# Patient Record
Sex: FEMALE | Race: BLACK OR AFRICAN AMERICAN | NOT HISPANIC OR LATINO | Employment: FULL TIME | URBAN - METROPOLITAN AREA
[De-identification: names, ages, dates, MRNs, and addresses within clinical notes are randomized per-mention and may not be internally consistent; named-entity substitution may affect disease eponyms.]

---

## 2021-04-27 ENCOUNTER — HISTORICAL (OUTPATIENT)
Dept: ADMINISTRATIVE | Facility: HOSPITAL | Age: 31
End: 2021-04-27

## 2021-04-27 LAB
ABS NEUT (OLG): 4.08 X10(3)/MCL (ref 2.1–9.2)
ALBUMIN SERPL-MCNC: 4 GM/DL (ref 3.5–5)
ALBUMIN/GLOB SERPL: 1.2 RATIO (ref 1.1–2)
ALP SERPL-CCNC: 58 UNIT/L (ref 40–150)
ALT SERPL-CCNC: 10 UNIT/L (ref 0–55)
ANISOCYTOSIS BLD QL SMEAR: 1
APPEARANCE, UA: CLEAR
AST SERPL-CCNC: 20 UNIT/L (ref 5–34)
B-HCG SERPL QL: NEGATIVE
BACTERIA SPEC CULT: ABNORMAL /HPF
BILIRUB SERPL-MCNC: 0.2 MG/DL
BILIRUB UR QL STRIP: NEGATIVE
BILIRUBIN DIRECT+TOT PNL SERPL-MCNC: 0.1 MG/DL (ref 0–0.5)
BILIRUBIN DIRECT+TOT PNL SERPL-MCNC: 0.1 MG/DL (ref 0–0.8)
BUN SERPL-MCNC: 8.8 MG/DL (ref 7–18.7)
BURR CELLS BLD QL SMEAR: 1 (ref 0–0)
CALCIUM SERPL-MCNC: 8.8 MG/DL (ref 8.4–10.2)
CHLORIDE SERPL-SCNC: 110 MMOL/L (ref 98–107)
CO2 SERPL-SCNC: 23 MMOL/L (ref 22–29)
COLOR UR: YELLOW
CREAT SERPL-MCNC: 0.65 MG/DL (ref 0.55–1.02)
CROSSMATCH INTERPRETATION: NORMAL
EOSINOPHIL NFR BLD MANUAL: 1 % (ref 0–8)
ERYTHROCYTE [DISTWIDTH] IN BLOOD BY AUTOMATED COUNT: ABNORMAL % (ref 11.5–17)
EST CREAT CLEARANCE SER (OHS): 98.27 ML/MIN
GLOBULIN SER-MCNC: 3.3 GM/DL (ref 2.4–3.5)
GLUCOSE (UA): NEGATIVE
GLUCOSE SERPL-MCNC: 102 MG/DL (ref 74–100)
GROUP & RH: NORMAL
HCT VFR BLD AUTO: 35.8 % (ref 37–47)
HGB BLD-MCNC: 10.8 GM/DL (ref 12–16)
HGB UR QL STRIP: NEGATIVE
HYPOCHROMIA BLD QL SMEAR: 1
KETONES UR QL STRIP: NEGATIVE
LEUKOCYTE ESTERASE UR QL STRIP: ABNORMAL
LYMPHOCYTES NFR BLD MANUAL: 51 % (ref 13–40)
MACROCYTES BLD QL SMEAR: 1 /MCL
MCH RBC QN AUTO: 22.5 PG (ref 27–31)
MCHC RBC AUTO-ENTMCNC: 29.6 GM/DL (ref 33–36)
MCV RBC AUTO: 75.8 FL (ref 80–94)
MICROCYTES BLD QL SMEAR: 1
MONOCYTES NFR BLD MANUAL: 4 % (ref 2–11)
NEUTROPHILS NFR BLD MANUAL: 44 % (ref 47–80)
NITRITE UR QL STRIP: NEGATIVE
NRBC BLD MANUAL-RTO: 1 %
PH UR STRIP: 5.5 [PH] (ref 5–9)
PLATELET # BLD AUTO: 272 X10(3)/MCL (ref 130–400)
PLATELET # BLD EST: NORMAL 10*3/UL
PMV BLD AUTO: 9.6 FL (ref 9.4–12.4)
POIKILOCYTOSIS BLD QL SMEAR: 1
POTASSIUM SERPL-SCNC: 3.7 MMOL/L (ref 3.5–5.1)
PRODUCT READY: NORMAL
PROT SERPL-MCNC: 7.3 GM/DL (ref 6.4–8.3)
PROT UR QL STRIP: NEGATIVE
RBC # BLD AUTO: 4.72 X10(6)/MCL (ref 4.2–5.4)
RBC #/AREA URNS HPF: ABNORMAL /[HPF]
RBC MORPH BLD: ABNORMAL
SODIUM SERPL-SCNC: 140 MMOL/L (ref 136–145)
SP GR UR STRIP: 1.02 (ref 1–1.03)
SQUAMOUS EPITHELIAL, UA: ABNORMAL /HPF (ref 0–4)
UROBILINOGEN UR STRIP-ACNC: 1
WBC # SPEC AUTO: 8.2 X10(3)/MCL (ref 4.5–11.5)
WBC #/AREA URNS HPF: 9 /HPF (ref 0–3)

## 2021-07-13 ENCOUNTER — HISTORICAL (OUTPATIENT)
Dept: ADMINISTRATIVE | Facility: HOSPITAL | Age: 31
End: 2021-07-13

## 2022-04-29 NOTE — ED PROVIDER NOTES
"   Patient:   Monique Nicole             MRN: [id number]            FIN: [id number]               Age:   30 years     Sex:  Female     :  1990   Associated Diagnoses:   Dysfunctional uterine bleeding; Dizziness; Headache   Author:   Rama Xiong MD      Basic Information   Time seen: Date & time 2021 21:55:00.   History source: Patient.   Arrival mode: Private vehicle, walking.   History limitation: None.   Additional information: Chief Complaint from Nursing Triage Note : Chief Complaint   2021 21:54 CDT      Chief Complaint           c/o vaginal bleeding x 1 month. blood transfusion 1 month ago. also c/o dizziness and nausea. pt denies SOB or chest pain.  .      History of Present Illness   The patient presents with 31 y/o F presents to ED with vaginal bleeding for the last month with associated abdominal cramping. Notes nausea, headache, dizziness and fatigue on exertion. Denies chest pain or SOB. States she had transfusion a month ago for same issue. ANYI Way and I, Raam Xiong MD, assumed care of this patient.     31 y/o F presents to the emergency department with continued vaginal bleeding for the last 1-2 months here she was admitted 3/26/2021 for symptomatic anemia related to menorrhagia, received transfusion on that visit and states she was initially feeling improved.  She reports some dizziness and nausea, denies any fever, chills, chest pain or shortness of breath.  Reports the bleeding has actually slowed since her previous hospitalization and has occasional heavy bleeding days.  She has not yet had GYN follow-up.  No ultrasound completed on previous admission..  The onset was 6  weeks ago.  The course/duration of symptoms is improving.  The degree of symptoms is moderate, "heavy bleeding".  Risk factors consist of history of anemia.  Prior episodes: occasional and multiple ED visits.  Therapy today: none.  Associated symptoms: nausea, denies fever, denies chills and " denies abdominal pain.        Review of Systems   Constitutional symptoms:  Weakness, fatigue, No fever,    Respiratory symptoms:  No shortness of breath,    Cardiovascular symptoms:  No chest pain, no syncope.    Gastrointestinal symptoms:  Nausea, no abdominal pain, no vomiting, no diarrhea.    Genitourinary symptoms:  Vaginal bleeding, No dysuria,    Neurologic symptoms:  Headache, dizziness.              Additional review of systems information: All other systems reviewed and otherwise negative.      Health Status   Allergies:    Allergic Reactions (Selected)  No Known Allergies,    Allergies (1) Active Reaction  No Known Allergies None Documented  .   Medications:  (Selected)   Prescriptions  Prescribed  Provera 10 mg oral tablet: 10 mg = 1 tab(s), Oral, Daily, # 10 tab(s), 0 Refill(s)  ferrous sulfate 325 mg (65 mg elemental iron) oral delayed release tablet: 325 mcmol/L = 1 tab(s), Oral, BID, # 60 tab(s), 3 Refill(s).      Past Medical/ Family/ Social History   Medical history:    No active or resolved past medical history items have been selected or recorded., Reviewed as documented in chart.   Surgical history:    No active procedure history items have been selected or recorded., Reviewed as documented in chart.   Family history:    No family history items have been selected or recorded..   Social history:    Social & Psychosocial Habits    Alcohol  04/05/2016  Use: Current    Type: Liquor    Frequency: 1-2 times per month    Substance Use  05/18/2018  Use: Never    Tobacco  05/18/2018  Use: Never smoker    03/26/2021  Use: Never (less than 100 in l    Patient Wants Consult For Cessation Counseling No    03/27/2021  Use: Never (less than 100 in l    Patient Wants Consult For Cessation Counseling No    Abuse/Neglect  03/26/2021  SHX Any signs of abuse or neglect No    03/27/2021  SHX Any signs of abuse or neglect No  , Reviewed as documented in chart.   Problem list:    Active Problems (3)  Anxiety    Insomnia   Obesity   .      Physical Examination               Vital Signs   Vital Signs   4/27/2021 21:54 CDT      Temperature Oral          36.7 DegC                             Temperature Oral (calculated)             98.06 DegF                             Peripheral Pulse Rate     85 bpm                             Respiratory Rate          18 br/min                             SpO2                      100 %                             Oxygen Therapy            Room air                             Systolic Blood Pressure   116 mmHg                             Diastolic Blood Pressure  80 mmHg  .      No qualifying data available.   General:  Alert, no acute distress.    Skin:  Warm, dry, normal for ethnicity.    Head:  Atraumatic.   Neck:  Trachea midline.   Eye:  Normal conjunctiva.   Ears, nose, mouth and throat:  Oral mucosa moist.   Cardiovascular:  Regular rate and rhythm, Normal peripheral perfusion.    Respiratory:  Respirations are non-labored.   Gastrointestinal:  Soft, Nontender, Non distended.    Genitourinary:  External genitalia: Normal, Speculum exam: Vaginal, mild, bleeding, Bimanual exam: Cervix closed, no tenderness.    Neurological:  Alert and oriented to person, place, time, and situation, No focal neurological deficit observed.    Psychiatric:  Cooperative.      Medical Decision Making   Rationale:  Ms. Nicole was admitted last month with symptomatic anemia.  Hemodynamically stable today.  Single low blood pressure reading documented was while she was asleep with her cuff arm up in the air.  She was positioned appropriately and repeat blood pressure was within normal limits.  She was given IV fluids and Toradol for her headache which did alleviate her symptoms.  We were called by the lab to inform that the automated hemogram machine was having trouble and therefore the complete result was not able to be resulted.  They were able to report a hemoglobin which was much improved from her  previous labs.  Per my discussion with the [facility], hemoglobin should be accurate  - they plan to have the pathologist do a peripheral smear evaluation on the sample in the morning.  As she is not actively bleeding at this time and has no other significant acute findings, I do believe it is safe for her to be discharged home.  We will review the CBC and call her to return if there is anything worrisome that would require emergent treatment.  On my review of her previous hospitalization, she did not have a pelvic ultrasound performed to evaluate for fibroid disease, etc. which may explain her heavy bleeding episodes.  I plan to refer her to [facility] gynecology to help establish follow-up care and will obtain the ultrasound to help further workup for source of her dysfunctional uterine bleeding.  No indication for Provera or Lysteda prescriptions at this time as she is not having significant bleeding at this time. ED return precautions discussed with patient at the bedside and provided in the printed discharge instructions. All questions answered. Patient should follow up with GYN. .   Documents reviewed:  Emergency department nurses' notes, emergency department records, prior records, Launch Documents   3/27/2021 6:15 CDT       History and Physical      History and physical    3/26/2021 19:44 CDT      ED Note-Physician         Abnormal diagnostic test  (Modified) .    Orders  Launch Orders   Laboratory:  UPT - Urine Pregancy Test (Order): Stat collect, Urine, 4/27/2021 21:57 CDT, Nurse collect, 4/27/2021 21:57 CDT  UA with Reflex (Order): Stat collect, Urine, 4/27/2021 21:57 CDT, Nurse collect, Print Label By Order Location  CMP (Order): Stat collect, 4/27/2021 21:57 CDT, Blood, Lab Collect, Print Label By Order Location, 4/27/2021 21:57 CDT  CBC w/ Auto Diff (Order): Stat collect, 4/27/2021 21:57 CDT, Blood, Lab Collect, Print Label By Order Location, 4/27/2021 21:57 CDT  Pharmacy:  Zofran 2 mg/mL injectable solution  (Order): 4 mg, form: Injection, IV Push, Once, first dose 4/27/2021 21:58 CDT, stop date 4/27/2021 21:58 CDT, STAT  IVF Normal Saline NS Bolus 1000ml 1,000 mL (Order): 1,000 mL, 1,000 mL, IV, Bolus, order duration: 1 dose(s), start date 4/27/2021 21:57 CDT.   Results review:  Lab results : Lab View   4/27/2021 22:58 CDT      Est Creat Clearance Ser   98.27 mL/min    4/27/2021 22:50 CDT      ABO/Rh                    A POS                             Antibody Screen           Pos    4/27/2021 22:10 CDT      Sodium Lvl                140 mmol/L                             Potassium Lvl             3.7 mmol/L                             Chloride                  110 mmol/L  HI                             CO2                       23 mmol/L                             Calcium Lvl               8.8 mg/dL                             Glucose Lvl               102 mg/dL  HI                             BUN                       8.8 mg/dL                             Creatinine                0.65 mg/dL                             eGFR-AA                   >60  NA                             eGFR-MIGUEL ANGEL                  >60 mL/min/1.73 m2  NA                             Bili Total                0.2 mg/dL                             Bili Direct               0.1 mg/dL                             Bili Indirect             0.10 mg/dL                             AST                       20 unit/L                             ALT                       10 unit/L                             Alk Phos                  58 unit/L                             Total Protein             7.3 gm/dL                             Albumin Lvl               4.0 gm/dL                             Globulin                  3.3 gm/dL                             A/G Ratio                 1.2 ratio                             WBC                       8.2 x10(3)/mcL                             RBC                       ++++ x10(6)/mcL                              Hgb                       10.8 gm/dL  LOW                             Hct                       ++++ %                             Platelet                  272 x10(3)/mcL                             MCV                       ++++ fL                             MCH                       ++++ pg                             MCHC                      ++++ gm/dL                             RDW                       NOTMEASURED. %                             MPV                       9.6 fL                             Abs Neut                  4.08 x10(3)/mcL    4/27/2021 21:58 CDT      U Beta hCG Ql             Negative                             UA Appear                 CLEAR                             UA Color                  YELLOW                             UA Spec Grav              1.019                             UA Bili                   Negative                             UA pH                     5.5                             UA Urobilinogen           1.0                             UA Blood                  Negative                             UA Glucose                Negative                             UA Ketones                Negative                             UA Protein                Negative                             UA Nitrite                Negative                             UA Leuk Est               2+                             UA WBC                    9 /HPF  HI                             UA RBC                    NONE SEEN                             UA Bacteria               NONE SEEN /HPF                             UA Squam Epithelial       NONE SEEN /HPF  ,    No qualifying data available, Interpretation Labs unremarkable.    Radiology results:  Ultrasound, pelvis, interpretation:  * Final Report *    Reason For Exam  dysfunctional uterine bleeding, eval for fibroids;Other (please specify)    Radiology Report  History:  Dysfunctional uterine bleeding     Comparison:  No  prior     Findings:  Transabdominal scanning of the pelvis with grayscale, color and  spectral Doppler.     Anteverted uterus measures 11 cm in length. The endometrium is not  thickened, measures less than 5 mm. No convincing uterine mass.     The right ovary measures 5.9 x 5.0 x 4.6 cm with a simple appearing  4-5 cm cyst. Vascular flow to the right ovary was demonstrated.     Left ovary is normal in appearance for patient's age measuring 3.2 x  3.0 x 2.1 cm. Vascular flow demonstrated.     No solid adnexal mass. Small amount of free fluid.     Impression:  No significant abnormality. A 4-5 cm simple appearing right ovarian  cyst could be within normal limits for age.       Signature Line  Electronically Signed By: Wilver Phillips MD  Date/Time Signed: 04/28/2021 09:50  .       Reexamination/ Reevaluation   Vital signs   results included from flowsheet : Vital Signs   4/28/2021 3:25 CDT       Peripheral Pulse Rate     76 bpm                             Heart Rate Monitored      76 bpm                             Respiratory Rate          18 br/min                             SpO2                      100 %                             Oxygen Therapy            Room air                             Systolic Blood Pressure   115 mmHg                             Diastolic Blood Pressure  79 mmHg                             Mean Arterial Pressure, Cuff              91 mmHg     Course: improving.      Impression and Plan   Diagnosis   Dysfunctional uterine bleeding (PKP44-HA N93.8)   Dizziness (NEP22-PT R42)   Headache (YEH00-JD R51.9)   Plan   Condition: Improved.    Disposition: Discharged: Time  4/28/2021 05:12:00, to home.    Prescriptions: Launch prescriptions   Pharmacy:  Zofran ODT 8 mg oral tablet, disintegrating (Prescribe): 8 mg = 1 tab(s), Oral, BID, PRN PRN nausea/vomiting, X 5 day(s), # 10 tab(s), 0 Refill(s)  Fioricet oral capsule (Prescribe): 1 cap(s), Oral, q4hr, PRN PRN as needed for headache, X 5  day(s), # 20 cap(s), 0 Refill(s).    Patient was given the following educational materials: Ovarian Cyst, Dizziness, Dysfunctional Uterine Bleeding, General Headache Without Cause.    Follow up with: Report to Emergency Department if symptoms return or worsen; [facility].    Counseled: Patient, Regarding diagnosis, Regarding diagnostic results, Regarding treatment plan, Regarding prescription, Patient indicated understanding of instructions.